# Patient Record
Sex: FEMALE | Race: OTHER | Employment: FULL TIME | URBAN - METROPOLITAN AREA
[De-identification: names, ages, dates, MRNs, and addresses within clinical notes are randomized per-mention and may not be internally consistent; named-entity substitution may affect disease eponyms.]

---

## 2022-03-30 ENCOUNTER — OFFICE VISIT (OUTPATIENT)
Dept: PRIMARY CARE CLINIC | Age: 22
End: 2022-03-30
Payer: COMMERCIAL

## 2022-03-30 VITALS
HEIGHT: 61 IN | BODY MASS INDEX: 27.9 KG/M2 | TEMPERATURE: 97.8 F | OXYGEN SATURATION: 99 % | SYSTOLIC BLOOD PRESSURE: 120 MMHG | WEIGHT: 147.8 LBS | DIASTOLIC BLOOD PRESSURE: 70 MMHG | HEART RATE: 69 BPM

## 2022-03-30 DIAGNOSIS — N60.11 FIBROCYSTIC BREAST CHANGES, BILATERAL: ICD-10-CM

## 2022-03-30 DIAGNOSIS — N60.12 FIBROCYSTIC BREAST CHANGES, BILATERAL: ICD-10-CM

## 2022-03-30 DIAGNOSIS — G43.109 MIGRAINE WITH AURA AND WITHOUT STATUS MIGRAINOSUS, NOT INTRACTABLE: ICD-10-CM

## 2022-03-30 DIAGNOSIS — M54.50 CHRONIC BILATERAL LOW BACK PAIN WITHOUT SCIATICA: Primary | ICD-10-CM

## 2022-03-30 DIAGNOSIS — G89.29 CHRONIC BILATERAL LOW BACK PAIN WITHOUT SCIATICA: Primary | ICD-10-CM

## 2022-03-30 PROCEDURE — 99203 OFFICE O/P NEW LOW 30 MIN: CPT | Performed by: FAMILY MEDICINE

## 2022-03-30 RX ORDER — IBUPROFEN 600 MG/1
600 TABLET ORAL 3 TIMES DAILY PRN
Qty: 90 TABLET | Refills: 1 | Status: SHIPPED | OUTPATIENT
Start: 2022-03-30 | End: 2022-06-01

## 2022-03-30 ASSESSMENT — PATIENT HEALTH QUESTIONNAIRE - PHQ9
3. TROUBLE FALLING OR STAYING ASLEEP: 0
5. POOR APPETITE OR OVEREATING: 0
1. LITTLE INTEREST OR PLEASURE IN DOING THINGS: 0
7. TROUBLE CONCENTRATING ON THINGS, SUCH AS READING THE NEWSPAPER OR WATCHING TELEVISION: 0
9. THOUGHTS THAT YOU WOULD BE BETTER OFF DEAD, OR OF HURTING YOURSELF: 0
2. FEELING DOWN, DEPRESSED OR HOPELESS: 0
SUM OF ALL RESPONSES TO PHQ9 QUESTIONS 1 & 2: 0
SUM OF ALL RESPONSES TO PHQ QUESTIONS 1-9: 3
10. IF YOU CHECKED OFF ANY PROBLEMS, HOW DIFFICULT HAVE THESE PROBLEMS MADE IT FOR YOU TO DO YOUR WORK, TAKE CARE OF THINGS AT HOME, OR GET ALONG WITH OTHER PEOPLE: 0
SUM OF ALL RESPONSES TO PHQ QUESTIONS 1-9: 3
6. FEELING BAD ABOUT YOURSELF - OR THAT YOU ARE A FAILURE OR HAVE LET YOURSELF OR YOUR FAMILY DOWN: 0
4. FEELING TIRED OR HAVING LITTLE ENERGY: 3
SUM OF ALL RESPONSES TO PHQ QUESTIONS 1-9: 3
SUM OF ALL RESPONSES TO PHQ QUESTIONS 1-9: 3
8. MOVING OR SPEAKING SO SLOWLY THAT OTHER PEOPLE COULD HAVE NOTICED. OR THE OPPOSITE, BEING SO FIGETY OR RESTLESS THAT YOU HAVE BEEN MOVING AROUND A LOT MORE THAN USUAL: 0

## 2022-03-30 NOTE — PATIENT INSTRUCTIONS
Patient Education        Dolor en la parte baja de la espalda (lumbalgia): Ejercicios  Low Back Pain: Exercises  Instrucciones de cuidado  Aquí se presentan algunos ejemplos de ejercicios típicos de rehabilitación para tratar ware afección. Empiece cada ejercicio lentamente. Reduzca la intensidaddel ejercicio si Dennie Brod a tener dolor. Ware médico o fisioterapeuta le dirán cuándo puede comenzar con estos ejerciciosy cuáles funcionarán mejor para usted. Cómo hacer los ejercicios  [place]art[place]as    1. Acuéstese boca abajo, apoyando ware cuerpo con los antebrazos. 2. Koffi presión con los codos en el piso para elevar la espalda. Al hacer esto, relaje los músculos del estómago y permita que ware espalda se arquee sin usar los músculos de la espalda. Al hacer presión, evite que las caderas o la pelvis se separen del piso. 3. Mantenga la posición de 15 a 30 segundos y luego relájese. 4. Repita de 2 a 4 veces. Ejercicio de alternar brazo y pierna (esther de caza)    Koffi abelardo ejercicio lentamente. Trate de mantener el cuerpo [place], y no deje que dg cadera caiga más abajo que la [place]. 1. Comience con las elsie y las rodillas en el piso. 2. Contraiga los músculos del abdomen. 3. Eleve dg pierna del suelo y manténgala recta detrás de usted. Tenga cuidado de no dejar caer la cadera hacia abajo, porque eso hará que se le tuerza el tronco.  4. Mantenga la posición cali unos 6 segundos y luego baje la pierna y [place] a la otra pierna. 5. Repita de 8 a 12 veces con cada pierna. 6. Con el tiempo, vaya aumentando [employer] de 10 a 30 segundos cada vez.  7. Si se siente estable y seguro con la pierna elevada, intente levantar el brazo opuesto directamente enfrente de usted al [employer]. Ejercicio de rodillas al pecho    1. Acuéstese boca arriba con las rodillas flexionadas y los pies apoyados sobre el piso.   2. Lleve dg [place], manteniendo el otro pie apoyado en el suelo (o dejando la las caderas y las rodillas estén en línea recta. 3. Mantenga la posición cali unos 6 segundos mientras sigue respirando normalmente, y luego baje lentamente las caderas hacia el piso y descanse por hasta 10 segundos. [address] 8 a 12 repeticiones. Estiramiento de isquiotibiales en el stefano de dg lolis    1. Acuéstese boca arriba en el stefano de [place], con dg pierna a través de la [place]. 2. Deslice la pierna hacia arriba por la pared, para enderezar la rodilla. Debe sentir un leve estiramiento en la parte posterior de la pierna. 3. Sostenga el estiramiento por lo menos de 15 a 30 segundos. No arquee la espalda, estire los dedos de los pies ni flexione las rodillas. Mantenga un talón tocando el suelo y el otro tocando la pared. 4. Repita con la otra pierna. 5. Repita de 2 a 4 veces con cada pierna. Estiramiento de los músculos flexores de la cadera    1. Póngase de rodillas en el suelo con dg Breanna Byes y Giselle Ruby atrás. Coloque la rodilla de adelante encima del pie. Mantenga la otra rodilla en contacto con el suelo. 2. Empuje lentamente la cadera hacia adelante hasta que sienta un estiramiento en la parte superior del muslo de la pierna que está atrás. 3. Sostenga el estiramiento por lo menos de 15 a 30 segundos. Repita con la otra pierna. 4. Repita de 2 a 4 veces a cada lado. Sentadillas de pared    1. Párese con la espalda a entre 10 y 12 pulgadas (25 a 30 cm) de distancia de la pared. 2. Inclínese hacia la pared [employer] la espalda quede plana sobre meir. 3. Deslícese lentamente hacia abajo hasta que las rodillas estén ligeramente flexionadas, presionando la parte baja de la espalda contra la pared. 4. Mantenga la posición cali unos 6 segundos y después deslícese hacia arriba por la pared. 5. Repita de 8 a 12 veces. La atención de seguimiento es dg parte clave de ware tratamiento y seguridad.  Asegúrese de hacer y acudir a todas las citas, y llame a ware médico si está teniendo problemas. También es dg buena idea saber los [place] de susexámenes y mantener dg lista de los medicamentos que sriram. ¿Dónde puede encontrar más información en inglés? Kandace Smith a [email or web address] e ingrese a ware cuenta de MyChart. Rachael Ramirez F778 en el Patricia Clinton \"Search Health Information\" para más información (en inglés) sobre \"Dolor en la parte baja de la espalda (lumbalgia): Ejercicios. \"     Si no tiene dg cuenta, mary alice laura en el enlace \"Sign Up Now\". Revisado: 1 julio, 2021               Versión del contenido: 13.2  © 1681-2607 Healthwise, Incorporated. Las instrucciones de cuidado fueron adaptadas bajo licencia por [facility] ([facility]). Si usted tiene [place] afección médica o sobre estas instrucciones, siempre pregunte a ware profesional de dewayne. Healthwise, Incorporated niega toda garantía o responsabilidad por ware uso de esta información.

## 2022-03-30 NOTE — PROGRESS NOTES
PROGRESS NOTE  Date of Service:  3/30/2022    Chief Complaint   Patient presents with    New Patient     Establish Care, need a PCP.  for Norma Smart ID # [id number]    Lower Back Pain     Started abot 6 months ago        SUBJECTIVE:  Jaren Wong is a 22 y.o. female, Croatian-speaking, Bobbi Jackson as , here as a new patient to establish care with me. Patient has 3 concerns #1 back pain for about 6 months. She does lifting and pushing at work 8 hours a day 5 days a week. Pain is localized and no history of trauma. Sometimes is stiff in the morning but it takes 3 to 4 minutes to limber up. No other joints involved. #2 migraine headache occurs about 2-3 times a week. Requesting ibuprofen. #3 had a breast lump removed in the past and feels that is on both breast sometimes it is painful. She has a bra size of 36-C, sometimes it feels heavy that aggravates her back pain. Past Medical History:   Diagnosis Date    Intraductal papilloma of right breast 2016    Had excision in [facility].      Past Surgical History:   Procedure Laterality Date    BREAST BIOPSY Right 2016    Intraductal papilloma      Social History     Tobacco Use    Smoking status: Never Smoker    Smokeless tobacco: Never Used   Substance Use Topics    Alcohol use: Never      Family History   Problem Relation Age of Onset    Diabetes Maternal Grandmother     High Blood Pressure Maternal Grandmother     High Blood Pressure Paternal Grandfather     High Cholesterol Paternal Grandfather     Heart Disease Paternal Grandfather      No current outpatient medications on file prior to visit. No current facility-administered medications on file prior to visit.        No Known Allergies     Review of Systems    OBJECTIVE:  /70 (Site: Left Upper Arm, Position: Sitting, Cuff Size: Medium Adult)   Pulse 69   Temp 97.8 °F (36.6 °C)   Ht 5' 1.2\" (1.554 m)   Wt 147 lb 12.8 oz (67 kg)   LMP 03/15/2022 SpO2 99%   BMI 27.74 kg/m²    Physical Exam  General Appearance: Alert, cooperative, no distress, appears stated age   [de-identified]: Normocephalic, PERRL, conjunctiva/sclera clear, EOM intact, TM's clear, oropharynx and nasopharynx clear   Neck: Supple, symmetrical, trachea midline, no adenopathy; thyroid: no enlargement/tenderness/nodules; no carotid bruit or JVD   Back: Symmetric, no curvature, ROM normal, no CVA tenderness, (+) paralumbar tenderness, SLR negative  Lungs: Clear to auscultation bilaterally, respirations unlabored   Breast: No nipple discharge or retraction, both breasts are dense and lumpy on both upper medial aspects. Heart: Regular rate and rhythm, S1 and S2 normal, no murmur, rub or gallop   Abdomen: Soft, non-tender, bowel sounds active all four quadrants, no masses, no organomegaly   Extremities: Extremities normal, atraumatic, no cyanosis or edema   Pulses: 2+ and symmetric all extremities   Skin: Skin color, texture, turgor normal, no rashes or lesions   Lymph nodes: Cervical, supraclavicular, and axillary nodes normal   Neurologic: CNII-XII intact, normal strength, sensation and reflexes throughout     ASSESSMENT:  1. Chronic bilateral low back pain without sciatica    2. Migraine with aura and without status migrainosus, not intractable    3. Fibrocystic breast changes, bilateral         PLAN:   1. Chronic bilateral low back pain without sciatica  Apply ice as needed. Will try ibuprofen 600 mg 3 times a day as needed. Back exercises provided. - ibuprofen (ADVIL;MOTRIN) 600 MG tablet; Take 1 tablet by mouth 3 times daily as needed for Pain  Dispense: 90 tablet; Refill: 1    2. Migraine with aura and without status migrainosus, not intractable  Migraine information provided, do headache diary and find for triggers and try to avoid it. Apply ice in the back of her head at the beating for headache. Can take ibuprofen as needed. - ibuprofen (ADVIL;MOTRIN) 600 MG tablet;  Take 1 tablet by mouth 3 times daily as needed for Pain  Dispense: 90 tablet; Refill: 1    3. Fibrocystic breast changes, bilateral  Sent for diagnostic mammogram and bilateral ultrasound as needed. - US BREAST COMPLETE RIGHT; Future  - US BREAST COMPLETE LEFT; Future  - JOAQUIN SAGAR DIGITAL DIAGNOSTIC BILATERAL; Future      Return in about 4 weeks (around 4/27/2022) for adult well check, follow on Migraine and back pain. Electronically signed by Ruchi Ferrell MD on 3/30/22 at 4:36 PM.     This dictation was generated by voice recognition computer software. Although all attempts are made to edit the dictation for accuracy, there may be errors in the transcription that are not intended.

## 2022-04-12 ENCOUNTER — HOSPITAL ENCOUNTER (OUTPATIENT)
Dept: ULTRASOUND IMAGING | Age: 22
Discharge: HOME | End: 2022-04-12
Payer: COMMERCIAL

## 2022-04-12 ENCOUNTER — APPOINTMENT (OUTPATIENT)
Dept: MAMMOGRAPHY | Age: 22
End: 2022-04-12
Payer: COMMERCIAL

## 2022-04-12 DIAGNOSIS — N60.11 FIBROCYSTIC BREAST CHANGES, BILATERAL: ICD-10-CM

## 2022-04-12 DIAGNOSIS — N60.12 FIBROCYSTIC BREAST CHANGES, BILATERAL: ICD-10-CM

## 2022-04-12 PROCEDURE — 76642 ULTRASOUND BREAST LIMITED: CPT

## 2022-05-13 ENCOUNTER — OFFICE VISIT (OUTPATIENT)
Dept: PRIMARY CARE CLINIC | Age: 22
End: 2022-05-13
Payer: COMMERCIAL

## 2022-05-13 VITALS
TEMPERATURE: 96.7 F | BODY MASS INDEX: 28.55 KG/M2 | HEART RATE: 77 BPM | HEIGHT: 61 IN | WEIGHT: 151.2 LBS | DIASTOLIC BLOOD PRESSURE: 70 MMHG | SYSTOLIC BLOOD PRESSURE: 120 MMHG | OXYGEN SATURATION: 99 %

## 2022-05-13 DIAGNOSIS — Z00.00 ENCOUNTER FOR WELL ADULT EXAM WITHOUT ABNORMAL FINDINGS: Primary | ICD-10-CM

## 2022-05-13 PROCEDURE — 99395 PREV VISIT EST AGE 18-39: CPT | Performed by: FAMILY MEDICINE

## 2022-05-13 ASSESSMENT — PATIENT HEALTH QUESTIONNAIRE - PHQ9
8. MOVING OR SPEAKING SO SLOWLY THAT OTHER PEOPLE COULD HAVE NOTICED. OR THE OPPOSITE, BEING SO FIGETY OR RESTLESS THAT YOU HAVE BEEN MOVING AROUND A LOT MORE THAN USUAL: 0
2. FEELING DOWN, DEPRESSED OR HOPELESS: 0
SUM OF ALL RESPONSES TO PHQ QUESTIONS 1-9: 0
SUM OF ALL RESPONSES TO PHQ QUESTIONS 1-9: 0
10. IF YOU CHECKED OFF ANY PROBLEMS, HOW DIFFICULT HAVE THESE PROBLEMS MADE IT FOR YOU TO DO YOUR WORK, TAKE CARE OF THINGS AT HOME, OR GET ALONG WITH OTHER PEOPLE: 0
SUM OF ALL RESPONSES TO PHQ QUESTIONS 1-9: 0
6. FEELING BAD ABOUT YOURSELF - OR THAT YOU ARE A FAILURE OR HAVE LET YOURSELF OR YOUR FAMILY DOWN: 0
9. THOUGHTS THAT YOU WOULD BE BETTER OFF DEAD, OR OF HURTING YOURSELF: 0
SUM OF ALL RESPONSES TO PHQ9 QUESTIONS 1 & 2: 0
SUM OF ALL RESPONSES TO PHQ QUESTIONS 1-9: 0
3. TROUBLE FALLING OR STAYING ASLEEP: 0
1. LITTLE INTEREST OR PLEASURE IN DOING THINGS: 0
7. TROUBLE CONCENTRATING ON THINGS, SUCH AS READING THE NEWSPAPER OR WATCHING TELEVISION: 0
4. FEELING TIRED OR HAVING LITTLE ENERGY: 0
5. POOR APPETITE OR OVEREATING: 0

## 2022-05-13 NOTE — PROGRESS NOTES
Chief Complaint   Patient presents with   Stephanie Escobar Annual Exam      [facility] ID # [id number]. Subjective:   Teresa Morocho is a 22 y.o. female and is here for a comprehensive physical exam.  Macanese-speaking, video  in the room. Patient reports her back has improved and only had 3 episodes of headache and has no recurrence. Been eating healthy and getting regular exercise. She plans to go back to school but she needs to work on her [place] language. Patient denies any concerns today. Denies any chest pains, shortness of breath, abdominal pain, melena medication.  History:  LMP: Patient's last menstrual period was 2022. Last pap date: Patient believes she had Pap smear last year when she saw her gynecologist, Dr. Mellissa Castaneda  Abnormal pap? ?  : 0  Para: 0  Breast US: 2022: no evidence of malignancy    Past Medical History:   Diagnosis Date    Intraductal papilloma of right breast 2016    Had excision in [facility].     Past Surgical History:   Procedure Laterality Date    BREAST BIOPSY Right 2016    Intraductal papilloma     Family History   Problem Relation Age of Onset    Diabetes Maternal Grandmother     High Blood Pressure Maternal Grandmother     High Blood Pressure Paternal Grandfather     High Cholesterol Paternal Grandfather     Heart Disease Paternal Grandfather      Social History     Tobacco Use    Smoking status: Never Smoker    Smokeless tobacco: Never Used   Substance Use Topics    Alcohol use: Never    Drug use: Never     Current Outpatient Medications   Medication Sig Dispense Refill    ibuprofen (ADVIL;MOTRIN) 600 MG tablet Take 1 tablet by mouth 3 times daily as needed for Pain (Patient not taking: Reported on 2022) 90 tablet 1     No current facility-administered medications for this visit. No Known Allergies    Do you take any herbs or supplements that were not prescribed by a doctor?  no  Are you taking calcium supplements? no  Are you taking aspirin daily? no    Review of Systems  Do you have pain that bothers you in your daily life? no  Review of Systems     Objective:   /70 (Site: Right Upper Arm, Position: Sitting, Cuff Size: Medium Adult)   Pulse 77   Temp 96.7 °F (35.9 °C)   Ht 5' 1.2\" (1.554 m)   Wt 151 lb 3.2 oz (68.6 kg)   LMP 04/19/2022   SpO2 99%   BMI 28.38 kg/m²    Physical Exam   Vision screening: OD 20/20, OS 20/20, OU 20/20 without correction  General:   alert and appears stated age   Gait:   normal   Skin:   normal   Oral cavity:   lips, mucosa, and tongue normal; teeth and gums normal   Eyes:   sclerae white, pupils equal and reactive, red reflex normal bilaterally   Ears:   normal bilaterally   Neck:   no adenopathy, supple, symmetrical, trachea midline and thyroid not enlarged, symmetric, no tenderness/mass/nodules   Lungs:  clear to auscultation bilaterally   Heart:   regular rate and rhythm, S1, S2 normal, no murmur, click, rub or gallop   Abdomen:  soft, non-tender; bowel sounds normal; no masses,  no organomegaly   Extremities:  Good range of motion, no edema, good pulses   Neuro:  normal without focal findings, mental status, speech normal, alert and oriented x3, ROSAURA and reflexes normal and symmetric      Assessment:     Healthy female exam.  22year-old     Plan:   1. Encounter for well adult exam without abnormal findings  Patient Counseling:  --Nutrition: Stressed importance of moderation in sodium/caffeine intake, saturated fat and cholesterol, caloric balance, sufficient intake of fresh fruits, vegetables, fiber, calcium, iron, and 1 mg of folate supplement per day (for females capable of pregnancy). --Discussed the issue of estrogen replacement, calcium supplement, and the daily use of baby aspirin. --Exercise: Stressed the importance of regular exercise.    --Substance Abuse: Discussed cessation/primary prevention of tobacco, alcohol, or other drug use; driving or other dangerous activities under the influence; availability of treatment for abuse. --Sexuality: Discussed sexually transmitted diseases, partner selection, use of condoms, avoidance of unintended pregnancy  and contraceptive alternatives. --Injury prevention: Discussed safety belts, safety helmets, smoke detector, smoking near bedding or upholstery. --Dental health: Discussed importance of regular tooth brushing, flossing, and dental visits. --Immunizations reviewed. Patient will [facility]  --Discussed benefits of screening colonoscopy. NA  -- Discussed healthy habits and regular exercise. Drawing centers provided for the fasting blood test and will call with test results. - CBC with Auto Differential; Future  - Lipid Panel; Future  - TSH with Reflex; Future  - Comprehensive Metabolic Panel; Future  - Hepatitis C Antibody; Future      Return in about 8 months (around 1/13/2023) for adult well check. Electronically signed by Mali Mejia MD on 5/13/2022 at 1:37 PM     This dictation was generated by voice recognition computer software. Although all attempts are made to edit the dictation for accuracy, there may be errors in the transcription that are not intended.

## 2022-05-13 NOTE — PATIENT INSTRUCTIONS

## 2022-05-27 ENCOUNTER — PATIENT MESSAGE (OUTPATIENT)
Dept: PRIMARY CARE CLINIC | Age: 22
End: 2022-05-27

## 2022-05-31 ENCOUNTER — TELEPHONE (OUTPATIENT)
Dept: PRIMARY CARE CLINIC | Age: 22
End: 2022-05-31

## 2022-05-31 NOTE — TELEPHONE ENCOUNTER
From: Freya Parrish  To: Dr. Deborah Anderson: 5/27/2022 8:53 PM EDT  Subject: Hello Doctor, it's Johalys     He wrote to you to see if there is any chance that they can give me an excuse for being away from work for a week, since I am sick I have a throat infection and body pain and the bosses don't want me to go to work like this but if I don't have an excuse they don't pay me those days. I'm taking some antibiotics but they haven't given me anything, I can hardly eat and I'm almost out of voice. If you think you're starting to make the excuse that it's next week now and that I can go back to work on the 6th. And if it is possible that they send it to my email [email or web address]

## 2022-05-31 NOTE — TELEPHONE ENCOUNTER
Pt is calling for a work note stating she has been sick a few days and needs a note from a doctor I told the pt that I see the message on my chart and the MA had told you that you Would need a appt pt is scheduled for June 1st with Wilber Sinha

## 2022-06-01 ENCOUNTER — OFFICE VISIT (OUTPATIENT)
Dept: PRIMARY CARE CLINIC | Age: 22
End: 2022-06-01
Payer: COMMERCIAL

## 2022-06-01 ENCOUNTER — NURSE ONLY (OUTPATIENT)
Dept: PRIMARY CARE CLINIC | Age: 22
End: 2022-06-01
Payer: COMMERCIAL

## 2022-06-01 VITALS
DIASTOLIC BLOOD PRESSURE: 76 MMHG | SYSTOLIC BLOOD PRESSURE: 118 MMHG | OXYGEN SATURATION: 99 % | BODY MASS INDEX: 28.16 KG/M2 | HEART RATE: 87 BPM | TEMPERATURE: 97.2 F | WEIGHT: 150 LBS

## 2022-06-01 DIAGNOSIS — R09.81 SINUS CONGESTION: ICD-10-CM

## 2022-06-01 DIAGNOSIS — Z00.00 ENCOUNTER FOR WELL ADULT EXAM WITHOUT ABNORMAL FINDINGS: ICD-10-CM

## 2022-06-01 DIAGNOSIS — R09.81 SINUS CONGESTION: Primary | ICD-10-CM

## 2022-06-01 PROBLEM — J02.9 SORE THROAT: Status: RESOLVED | Noted: 2022-06-01 | Resolved: 2022-06-01

## 2022-06-01 PROBLEM — J02.9 SORE THROAT: Status: ACTIVE | Noted: 2022-06-01

## 2022-06-01 LAB
A/G RATIO: 1.8 (ref 1.1–2.2)
ALBUMIN SERPL-MCNC: 4.7 G/DL (ref 3.4–5)
ALP BLD-CCNC: 101 U/L (ref 40–129)
ALT SERPL-CCNC: 12 U/L (ref 10–40)
ANION GAP SERPL CALCULATED.3IONS-SCNC: 15 MMOL/L (ref 3–16)
AST SERPL-CCNC: 14 U/L (ref 15–37)
BASOPHILS ABSOLUTE: 0.1 K/UL (ref 0–0.2)
BASOPHILS RELATIVE PERCENT: 1 %
BILIRUB SERPL-MCNC: 0.3 MG/DL (ref 0–1)
BUN BLDV-MCNC: 10 MG/DL (ref 7–20)
CALCIUM SERPL-MCNC: 9.7 MG/DL (ref 8.3–10.6)
CHLORIDE BLD-SCNC: 98 MMOL/L (ref 99–110)
CHOLESTEROL, TOTAL: 174 MG/DL (ref 0–199)
CO2: 24 MMOL/L (ref 21–32)
CREAT SERPL-MCNC: 0.7 MG/DL (ref 0.6–1.1)
EOSINOPHILS ABSOLUTE: 0.2 K/UL (ref 0–0.6)
EOSINOPHILS RELATIVE PERCENT: 2 %
GFR AFRICAN AMERICAN: >60
GFR NON-AFRICAN AMERICAN: >60
GLUCOSE BLD-MCNC: 78 MG/DL (ref 70–99)
HCT VFR BLD CALC: 39.8 % (ref 36–48)
HDLC SERPL-MCNC: 45 MG/DL (ref 40–60)
HEMOGLOBIN: 13.2 G/DL (ref 12–16)
HEPATITIS C ANTIBODY INTERPRETATION: NORMAL
INFLUENZA A ANTIBODY: NEGATIVE
INFLUENZA B ANTIBODY: NEGATIVE
LDL CHOLESTEROL CALCULATED: 109 MG/DL
LYMPHOCYTES ABSOLUTE: 3.6 K/UL (ref 1–5.1)
LYMPHOCYTES RELATIVE PERCENT: 47.2 %
MCH RBC QN AUTO: 28.9 PG (ref 26–34)
MCHC RBC AUTO-ENTMCNC: 33.2 G/DL (ref 31–36)
MCV RBC AUTO: 86.9 FL (ref 80–100)
MONOCYTES ABSOLUTE: 0.6 K/UL (ref 0–1.3)
MONOCYTES RELATIVE PERCENT: 7.4 %
NEUTROPHILS ABSOLUTE: 3.3 K/UL (ref 1.7–7.7)
NEUTROPHILS RELATIVE PERCENT: 42.4 %
PDW BLD-RTO: 13.3 % (ref 12.4–15.4)
PLATELET # BLD: 473 K/UL (ref 135–450)
PMV BLD AUTO: 7.9 FL (ref 5–10.5)
POTASSIUM SERPL-SCNC: 4.4 MMOL/L (ref 3.5–5.1)
RBC # BLD: 4.58 M/UL (ref 4–5.2)
S PYO AG THROAT QL: NORMAL
SODIUM BLD-SCNC: 137 MMOL/L (ref 136–145)
TOTAL PROTEIN: 7.3 G/DL (ref 6.4–8.2)
TRIGL SERPL-MCNC: 102 MG/DL (ref 0–150)
TSH REFLEX: 1.31 UIU/ML (ref 0.27–4.2)
VLDLC SERPL CALC-MCNC: 20 MG/DL
WBC # BLD: 7.7 K/UL (ref 4–11)

## 2022-06-01 PROCEDURE — 99203 OFFICE O/P NEW LOW 30 MIN: CPT

## 2022-06-01 PROCEDURE — 87804 INFLUENZA ASSAY W/OPTIC: CPT | Performed by: INTERNAL MEDICINE

## 2022-06-01 PROCEDURE — 87880 STREP A ASSAY W/OPTIC: CPT | Performed by: INTERNAL MEDICINE

## 2022-06-01 RX ORDER — PSEUDOEPHEDRINE HYDROCHLORIDE 30 MG/1
30 TABLET ORAL EVERY 6 HOURS PRN
Qty: 60 TABLET | Refills: 0 | Status: SHIPPED | OUTPATIENT
Start: 2022-06-01 | End: 2022-07-01

## 2022-06-01 RX ORDER — ACETAMINOPHEN 500 MG
500 TABLET ORAL EVERY 6 HOURS PRN
Qty: 120 TABLET | Refills: 0 | Status: SHIPPED | OUTPATIENT
Start: 2022-06-01 | End: 2022-07-01

## 2022-06-01 ASSESSMENT — ENCOUNTER SYMPTOMS
NAUSEA: 0
DIARRHEA: 0
COUGH: 0
WHEEZING: 0
CHEST TIGHTNESS: 0
ABDOMINAL PAIN: 0
VOMITING: 0
BLOOD IN STOOL: 0
SHORTNESS OF BREATH: 0
SORE THROAT: 1

## 2022-06-01 NOTE — ASSESSMENT & PLAN NOTE
Suspect viral cause. Covid, flu and strep test today - abx if strep +. Nasal decongestant and tylenol for supportive care, increase fluids and rest. Work note provided for rest of week. F/u in 5 days if no improvement or worsens.

## 2022-06-01 NOTE — PROGRESS NOTES
Olvin Miranda (:  2000) is a 22 y.o. female,Established patient, here for evaluation of the following chief complaint(s):  Pharyngitis (c/o sore throat, body pains, nasal congestion, headaches. Needs letter to return to work. Missed work 22 and today.  Ever #[id number])      HPI   Patient of Dr. John Sinha presents for sick visit for sore throat, nasal congestion, HA, and body aches x5 days, since Friday. Patient has not had a covid test yet, denies sick contact. +pain with swallowing. ASSESSMENT/PLAN:  1. Sinus congestion  Assessment & Plan:  Suspect viral cause. Covid, flu and strep test today - abx if strep +. Nasal decongestant and tylenol for supportive care, increase fluids and rest. Work note provided for rest of week. F/u in 5 days if no improvement or worsens. Orders:  -     COVID-19; Future  -     Strep Screen Group A Throat; Future  -     Rapid influenza A/B antigens; Future  -     pseudoephedrine (DECONGESTANT) 30 MG tablet; Take 1 tablet by mouth every 6 hours as needed for Congestion, Disp-60 tablet, R-0Normal  -     acetaminophen (TYLENOL) 500 MG tablet; Take 1 tablet by mouth every 6 hours as needed for Pain, Disp-120 tablet, R-0Normal       /76   Pulse 87   Temp 97.2 °F (36.2 °C) (Infrared)   Wt 150 lb (68 kg)   SpO2 99%   BMI 28.16 kg/m²    VSS    SUBJECTIVE/OBJECTIVE:  Review of Systems   Constitutional: Negative for fatigue, fever and unexpected weight change. HENT: Positive for congestion and sore throat. Respiratory: Negative for cough, chest tightness, shortness of breath and wheezing. Cardiovascular: Negative for chest pain, palpitations and leg swelling. Gastrointestinal: Negative for abdominal pain, blood in stool, diarrhea, nausea and vomiting. Musculoskeletal: Positive for arthralgias and myalgias. Neurological: Positive for headaches. Negative for dizziness, weakness and light-headedness.        Physical Exam  Vitals and nursing note reviewed. Constitutional:       General: She is not in acute distress. Appearance: Normal appearance. She is ill-appearing. HENT:      Right Ear: Tympanic membrane and ear canal normal.      Left Ear: Tympanic membrane and ear canal normal.      Ears:      Comments: Slight erythema bilaterally to ear canal     Nose: Congestion present. Mouth/Throat:      Mouth: Mucous membranes are moist.      Pharynx: Oropharynx is clear. Posterior oropharyngeal erythema present. Eyes:      General:         Right eye: No discharge. Left eye: No discharge. Conjunctiva/sclera: Conjunctivae normal.   Cardiovascular:      Rate and Rhythm: Normal rate and regular rhythm. Heart sounds: Normal heart sounds. Pulmonary:      Effort: Pulmonary effort is normal.      Breath sounds: Normal breath sounds. Skin:     General: Skin is warm and dry. Neurological:      Mental Status: She is alert and oriented to person, place, and time. Mental status is at baseline. Current Outpatient Medications   Medication Sig Dispense Refill    pseudoephedrine (DECONGESTANT) 30 MG tablet Take 1 tablet by mouth every 6 hours as needed for Congestion 60 tablet 0    acetaminophen (TYLENOL) 500 MG tablet Take 1 tablet by mouth every 6 hours as needed for Pain 120 tablet 0     No current facility-administered medications for this visit. Return in about 5 days (around 6/6/2022), or if symptoms worsen or fail to improve.     Electronically signed by LINDY Borrero CNP on 6/1/2022 at 8:41 AM

## 2022-06-01 NOTE — Clinical Note
[address] [place] [address]  Phone: [phone]  Fax: [phone]    LINDY Eric CNP        June 1, 2022     Patient: Jhoana Saleem   YOB: 2000   Date of Visit: 6/1/2022       To Whom it May Concern:    Jhoana Saleem was seen in my clinic on 6/1/2022. She {Return to school/sport/work:09155}. If you have any questions or concerns, please don't hesitate to call.     Sincerely,         LINDY Eric CNP

## 2022-06-01 NOTE — LETTER
[address] [facility] [address]  Phone: [phone]  Fax: [phone]    LINDY Murphy CNP        June 1, 2022     Patient: Herber Cummings   YOB: 2000   Date of Visit: 6/1/2022       To Whom It May Concern:    Please excuse Alison from work 5/31-6/6. It is my medical opinion that Herber Cummings may return to work on 6/6/2022. If you have any questions or concerns, please don't hesitate to call.     Sincerely,        LINDY Murphy CNP

## 2022-06-01 NOTE — PATIENT INSTRUCTIONS
Patient Education        Aprenda sobre los síntomas de COVID-19 y la gripe  Learning About COVID-19 and Flu Symptoms  ¿Cómo se puede diferenciar COVID-19 de la gripe? COVID-19 y la gripe tienen síntomas similares. Pueden ser difíciles de diferenciar. La única manera de saber con certeza qué enfermedad tiene es hacerse dg prueba. Si tiene [employer] de detección de COVID-19, pregúntele al médico o visite cdc.gov para usar la herramienta dedetección viral de COVID-19. Dado que los síntomas son tan parecidos, tiene sentido actuar liv si tuviera COVID-19 hasta que [employer] de la prueba. Dulce significa quedarse en casa y limitar el contacto con otras personas en ware hogar. Deberá lavarse las elsie con frecuencia y desinfectar las superficies que toque. Y asegúrese de usar dg mascarilla cuando esté cerca de otras personas. Estos también sonbuenos consejos si jose que tiene gripe. COVID-19 y la gripe tienen estos síntomas en común: Aissatou Cutler o escalofríos   Tos   Falta de aire   Fatiga (cansancio)   Dolor de garganta   Congestión o goteo nasal   Neela musculares y corporales   Dolor de roro   Vómito y diarrea (más común en niños que en adultos)  COVID-19 tiene otro síntoma que [place] puede presentarse:  Hernandez Ratel pérdida del gusto o el olfato  Los síntomas de COVID-19 pueden [employer] 2 y 15 gabe después de [address]. Los síntomas de la gripe suelen presentarse entre 1 y 3 días después de lainfección. ¿Por qué debería vacunarse contra la gripe cali la pandemia de COVID-19? Es importante ponerse la vacuna anual contra la gripe. Tanto la gripe liv COVID-19 pueden estar activos al [employer]. Ambas infecciones pueden enfermarlo al mismo tiempo. Y tener ambas infecciones puede enfermarlo más quetener solo [facility]. La vacuna contra la gripe no lo protegerá contra COVID-19. [place] puede ayudar a prevenir la gripe o a reducir rosio síntomas.  Si menos personas se enferman gravemente debido a la gripe, esto ayudará a dejar libres recursos médicosnecesarios para los pacientes con COVID-19.  ¿Dónde puede encontrar más información en inglés? Vallorkritsopher June a [email or web address] org e ingrese a ware cuenta de MyChart. Abdiaziz Hicks C123 en el cuadro \"Search Health Information\" para más información (en inglés) sobre \"Aprenda sobre los síntomas de COVID-19 y la gripe. \"     Si no tiene dg cuenta, mary alice laura en el enlace \"Sign Up Now\". Revisado: [address], 2021               Versión del contenido: 13.2  © 6523-9224 Healthwise, Incorporated. Las instrucciones de cuidado fueron adaptadas bajo licencia por [facility] ([facility]). Si usted tiene [place] afección médica o sobre estas instrucciones, siempre pregunte a ware profesional de dewayne. Healthwise, Incorporated niega toda garantía o responsabilidad por ware uso de esta información.

## 2022-06-02 LAB — SARS-COV-2: NOT DETECTED

## 2023-01-13 ENCOUNTER — OFFICE VISIT (OUTPATIENT)
Dept: PRIMARY CARE CLINIC | Age: 23
End: 2023-01-13
Payer: COMMERCIAL

## 2023-01-13 VITALS
HEIGHT: 62 IN | HEART RATE: 80 BPM | SYSTOLIC BLOOD PRESSURE: 102 MMHG | WEIGHT: 144 LBS | BODY MASS INDEX: 26.5 KG/M2 | OXYGEN SATURATION: 98 % | DIASTOLIC BLOOD PRESSURE: 70 MMHG

## 2023-01-13 DIAGNOSIS — Z00.00 ROUTINE GENERAL MEDICAL EXAMINATION AT A HEALTH CARE FACILITY: Primary | ICD-10-CM

## 2023-01-13 DIAGNOSIS — Z23 NEED FOR INFLUENZA VACCINATION: ICD-10-CM

## 2023-01-13 DIAGNOSIS — Z23 NEED FOR TDAP VACCINATION: ICD-10-CM

## 2023-01-13 DIAGNOSIS — Z23 NEED FOR HPV VACCINATION: ICD-10-CM

## 2023-01-13 DIAGNOSIS — G43.109 MIGRAINE WITH AURA AND WITHOUT STATUS MIGRAINOSUS, NOT INTRACTABLE: ICD-10-CM

## 2023-01-13 PROCEDURE — 90651 9VHPV VACCINE 2/3 DOSE IM: CPT | Performed by: FAMILY MEDICINE

## 2023-01-13 PROCEDURE — 90472 IMMUNIZATION ADMIN EACH ADD: CPT | Performed by: FAMILY MEDICINE

## 2023-01-13 PROCEDURE — 90715 TDAP VACCINE 7 YRS/> IM: CPT | Performed by: FAMILY MEDICINE

## 2023-01-13 PROCEDURE — 99395 PREV VISIT EST AGE 18-39: CPT | Performed by: FAMILY MEDICINE

## 2023-01-13 PROCEDURE — 90471 IMMUNIZATION ADMIN: CPT | Performed by: FAMILY MEDICINE

## 2023-01-13 PROCEDURE — 90674 CCIIV4 VAC NO PRSV 0.5 ML IM: CPT | Performed by: FAMILY MEDICINE

## 2023-01-13 RX ORDER — IBUPROFEN 600 MG/1
600 TABLET ORAL 3 TIMES DAILY PRN
Qty: 90 TABLET | Refills: 1 | Status: SHIPPED | OUTPATIENT
Start: 2023-01-13

## 2023-01-13 ASSESSMENT — PATIENT HEALTH QUESTIONNAIRE - PHQ9
2. FEELING DOWN, DEPRESSED OR HOPELESS: 0
SUM OF ALL RESPONSES TO PHQ QUESTIONS 1-9: 0
SUM OF ALL RESPONSES TO PHQ9 QUESTIONS 1 & 2: 0
SUM OF ALL RESPONSES TO PHQ QUESTIONS 1-9: 0
1. LITTLE INTEREST OR PLEASURE IN DOING THINGS: 0

## 2023-01-13 NOTE — PATIENT INSTRUCTIONS
Well Visit, Ages 25 to 72: Care Instructions  Well visits can help you stay healthy. Your doctor has checked your overall health and may have suggested ways to take good care of yourself. Your doctor also may have recommended tests. You can help prevent illness with healthy eating, good sleep, vaccinations, regular exercise, and other steps. Get the tests that you and your doctor decide on. Depending on your age and risks, examples might include screening for diabetes; hepatitis C; HIV; and cervical, breast, lung, and colon cancer. Screening helps find diseases before any symptoms appear. Eat healthy foods. Choose fruits, vegetables, whole grains, lean protein, and low-fat dairy foods. Limit saturated fat and reduce salt. Limit alcohol. Men should have no more than 2 drinks a day. Women should have no more than 1. For some people, no alcohol is the best choice. Exercise. Get at least 30 minutes of exercise on most days of the week. Walking can be a good choice. Reach and stay at your healthy weight. This will lower your risk for many health problems. Take care of your mental health. Try to stay connected with friends, family, and community, and find ways to manage stress. If you're feeling depressed or hopeless, talk to someone. A counselor can help. If you don't have a counselor, talk to your doctor. Talk to your doctor if you think you may have a problem with alcohol or drug use. This includes prescription medicines and illegal drugs. Avoid tobacco and nicotine: Don't smoke, vape, or chew. If you need help quitting, talk to your doctor. Practice safer sex. Getting tested, using condoms or dental dams, and limiting sex partners can help prevent STIs. Use birth control if it's important to you to prevent pregnancy. Talk with your doctor about your choices and what might be best for you. Prevent problems where you can.  Protect your skin from too much sun, wash your hands, brush your teeth twice a day, and wear a seat belt in the car. Where can you learn more? Go to http://www.guadarrama.com/ and enter P072 to learn more about \"Well Visit, Ages 25 to 72: Care Instructions. \"  Current as of: March 9, 2022               Content Version: 13.5  © 9330-2349 [employer], [employer]orporated. Care instructions adapted under license by [facility] ([facility]). If you have questions about a medical condition or this instruction, always ask your healthcare professional. [address] any warranty or liability for your use of this information.

## 2023-01-13 NOTE — PROGRESS NOTES
Chief Complaint   Patient presents with    Annual Exam     [facility] #[id number]  c/o bump behind right ear     Subjective:   Alison Lockhart is a 22 y.o. female, and is here for a comprehensive physical exam.  Australian-speaking, video  in the room.  Denies any chest pains, shortness of breath, abdominal pain, melena medication.  Complains of lump behind right ear and sometimes it has pain.  No ear drainage.  Denies any history of trauma.  She works in a factory, does packing.    Patient's been able to manage her migraine and identified her trigger which is coffee.  She just needed a refill of ibuprofen just to take it as needed.     History:  LMP: Patient's last menstrual period was 2022 (exact date).  Last pap date: Patient believes she had Pap smear last year when she saw her gynecologist, Dr. Barfield  Abnormal pap?  ?  : 0  Para: 0  Breast US: 2022: no evidence of malignancy    Past Medical History:   Diagnosis Date    Intraductal papilloma of right breast 2016    Had excision in [place].     Past Surgical History:   Procedure Laterality Date    BREAST BIOPSY Right 2016    Intraductal papilloma     Family History   Problem Relation Age of Onset    Diabetes Maternal Grandmother     High Blood Pressure Maternal Grandmother     High Blood Pressure Paternal Grandfather     High Cholesterol Paternal Grandfather     Heart Disease Paternal Grandfather      Social History     Tobacco Use    Smoking status: Never    Smokeless tobacco: Never   Vaping Use    Vaping Use: Never used   Substance Use Topics    Alcohol use: Never    Drug use: Never     Current Outpatient Medications   Medication Sig Dispense Refill    ibuprofen (ADVIL;MOTRIN) 600 MG tablet Take 1 tablet by mouth 3 times daily as needed for Pain 90 tablet 1     No current facility-administered medications for this visit.      No Known Allergies    Do you take any herbs or supplements that were not prescribed by a doctor?  no  Are you taking calcium supplements? no  Are you taking aspirin daily? no    Review of Systems  Do you have pain that bothers you in your daily life? no  Review of Systems     Objective:   /70   Pulse 80   Ht 5' 1.5\" (1.562 m)   Wt 144 lb (65.3 kg)   LMP 12/21/2022 (Exact Date)   SpO2 98%   BMI 26.77 kg/m²    Physical Exam   Vision screening: OD 20/20, OS 20/20, OU 20/20 without correction  General:   alert and appears stated age   Gait:   normal   Skin:   normal   Oral cavity:   lips, mucosa, and tongue normal; teeth and gums normal   Eyes:   sclerae white, pupils equal and reactive, red reflex normal bilaterally   Ears:   normal bilaterally, bony prominence noted behind both ears and no tenderness. Neck:   no adenopathy, supple, symmetrical, trachea midline and thyroid not enlarged, symmetric, no tenderness/mass/nodules   Lungs:  clear to auscultation bilaterally   Heart:   regular rate and rhythm, S1, S2 normal, no murmur, click, rub or gallop   Abdomen:  soft, non-tender; bowel sounds normal; no masses,  no organomegaly   Extremities:  Good range of motion, no edema, good pulses   Neuro:  normal without focal findings, mental status, speech normal, alert and oriented x3, ROSAURA and reflexes normal and symmetric      Assessment:     Healthy female exam.  25year-old     Plan:   1. Encounter for well adult exam without abnormal findings  Patient Counseling:  --Nutrition: Stressed importance of moderation in sodium/caffeine intake, saturated fat and cholesterol, caloric balance, sufficient intake of fresh fruits, vegetables, fiber, calcium, iron, and 1 mg of folate supplement per day (for females capable of pregnancy). --Exercise: Stressed the importance of regular exercise. --Substance Abuse: Discussed cessation/primary prevention of tobacco, alcohol, or other drug use; driving or other dangerous activities under the influence; availability of treatment for abuse.     --Sexuality: Discussed sexually transmitted diseases, partner selection, use of condoms, avoidance of unintended pregnancy  and contraceptive alternatives.   --Injury prevention: Discussed safety belts, safety helmets, smoke detector, smoking near bedding or upholstery.   --Dental health: Discussed importance of regular tooth brushing, flossing, and dental visits.  --Immunizations reviewed.  Patient will try to get her immunization records from [place].  --Discussed benefits of screening colonoscopy. NA  -- Discussed healthy habits and regular exercise.       2. Need for influenza vaccination  VIS given.  No history of immunization reactions.  - Influenza, FLUCELVAX, (age 6 mo+), IM, Preservative Free, 0.5 mL    3. Need for HPV vaccination  VIS given.  No history of immunization reaction.  Follow-up in 2 months for HPV #2.  - HPV, GARDASIL 9, (AGE 9-45 YRS), IM    4. Need for Tdap vaccination  VIS given.  No history of immunization reaction.  - Tdap, BOOSTRIX, (age 10 yrs+), IM    5. Migraine with aura and without status migrainosus, not intractable  Patient's been able to identify and manage her headache.  Ibuprofen refilled as requested.  - ibuprofen (ADVIL;MOTRIN) 600 MG tablet; Take 1 tablet by mouth 3 times daily as needed for Pain  Dispense: 90 tablet; Refill: 1    6.  Ear pain/lump, patient reassured no tumor felt, it is more of a bony prominence.  Since she works in a factory, does lifting it could be muscle strain in the neck area.  Encouraged to drink more water to avoid muscle spasms or cramps.  Apply ice as needed and may also take ibuprofen as needed.    Return in about 1 year (around 1/13/2024) for adult well check, f/u in 2 months for HPV#2 MA only.     Electronically signed by Eleia J Reyes, MD on 1/13/2023 at 3:39 PM     This dictation was generated by voice recognition computer software.  Although all attempts are made to edit the dictation for accuracy, there may be errors in the transcription that are not intended.

## 2023-09-20 ENCOUNTER — NURSE ONLY (OUTPATIENT)
Dept: PRIMARY CARE CLINIC | Age: 23
End: 2023-09-20
Payer: COMMERCIAL

## 2023-09-20 DIAGNOSIS — Z32.01 POSITIVE PREGNANCY TEST: Primary | ICD-10-CM

## 2023-09-20 LAB
CONTROL: POSITIVE
PREGNANCY TEST URINE, POC: POSITIVE

## 2023-09-20 PROCEDURE — 81025 URINE PREGNANCY TEST: CPT | Performed by: FAMILY MEDICINE

## 2023-09-20 NOTE — PROGRESS NOTES
Patient here for pregnancy test. LMP 8/11     Results: Positive.      She is also requesting a letter for work to confirm positive test.

## 2023-10-06 ENCOUNTER — HOSPITAL ENCOUNTER (EMERGENCY)
Age: 23
Discharge: HOME | End: 2023-10-07
Attending: STUDENT IN AN ORGANIZED HEALTH CARE EDUCATION/TRAINING PROGRAM
Payer: COMMERCIAL

## 2023-10-06 ENCOUNTER — APPOINTMENT (OUTPATIENT)
Dept: ULTRASOUND IMAGING | Age: 23
End: 2023-10-06
Payer: COMMERCIAL

## 2023-10-06 VITALS
SYSTOLIC BLOOD PRESSURE: 131 MMHG | HEIGHT: 61 IN | OXYGEN SATURATION: 99 % | HEART RATE: 93 BPM | BODY MASS INDEX: 28.13 KG/M2 | TEMPERATURE: 98.6 F | WEIGHT: 149 LBS | RESPIRATION RATE: 16 BRPM | DIASTOLIC BLOOD PRESSURE: 83 MMHG

## 2023-10-06 DIAGNOSIS — R10.9 ABDOMINAL PAIN, UNSPECIFIED ABDOMINAL LOCATION: Primary | ICD-10-CM

## 2023-10-06 LAB
ABO + RH BLD: NORMAL
ALBUMIN SERPL-MCNC: 4.6 G/DL (ref 3.4–5)
ALBUMIN/GLOB SERPL: 1.4 {RATIO} (ref 1.1–2.2)
ALP SERPL-CCNC: 96 U/L (ref 40–129)
ALT SERPL-CCNC: 11 U/L (ref 10–40)
ANION GAP SERPL CALCULATED.3IONS-SCNC: 11 MMOL/L (ref 3–16)
AST SERPL-CCNC: 14 U/L (ref 15–37)
B-HCG SERPL EIA 3RD IS-ACNC: NORMAL MIU/ML
BACTERIA GENITAL QL WET PREP: NORMAL
BACTERIA URNS QL MICRO: NORMAL /HPF
BASOPHILS # BLD: 0.1 K/UL (ref 0–0.2)
BASOPHILS NFR BLD: 0.4 %
BILIRUB SERPL-MCNC: <0.2 MG/DL (ref 0–1)
BILIRUB UR QL STRIP.AUTO: NEGATIVE
BUN SERPL-MCNC: 10 MG/DL (ref 7–20)
CALCIUM SERPL-MCNC: 9.8 MG/DL (ref 8.3–10.6)
CHLORIDE SERPL-SCNC: 101 MMOL/L (ref 99–110)
CLARITY UR: ABNORMAL
CLUE CELLS SPEC QL WET PREP: NORMAL
CO2 SERPL-SCNC: 26 MMOL/L (ref 21–32)
COLOR UR: YELLOW
CREAT SERPL-MCNC: 0.5 MG/DL (ref 0.6–1.1)
DEPRECATED RDW RBC AUTO: 12.8 % (ref 12.4–15.4)
EOSINOPHIL # BLD: 0.1 K/UL (ref 0–0.6)
EOSINOPHIL NFR BLD: 0.6 %
EPI CELLS #/AREA URNS AUTO: 0 /HPF (ref 0–5)
EPI CELLS SPEC QL WET PREP: NORMAL
GFR SERPLBLD CREATININE-BSD FMLA CKD-EPI: >60 ML/MIN/{1.73_M2}
GLUCOSE SERPL-MCNC: 113 MG/DL (ref 70–99)
GLUCOSE UR STRIP.AUTO-MCNC: NEGATIVE MG/DL
HCT VFR BLD AUTO: 39.8 % (ref 36–48)
HGB BLD-MCNC: 13.4 G/DL (ref 12–16)
HGB UR QL STRIP.AUTO: NEGATIVE
HYALINE CASTS #/AREA URNS AUTO: 0 /LPF (ref 0–8)
KETONES UR STRIP.AUTO-MCNC: NEGATIVE MG/DL
LEUKOCYTE ESTERASE UR QL STRIP.AUTO: NEGATIVE
LYMPHOCYTES # BLD: 4.5 K/UL (ref 1–5.1)
LYMPHOCYTES NFR BLD: 32.1 %
MCH RBC QN AUTO: 29.5 PG (ref 26–34)
MCHC RBC AUTO-ENTMCNC: 33.6 G/DL (ref 31–36)
MCV RBC AUTO: 87.9 FL (ref 80–100)
MONOCYTES # BLD: 0.8 K/UL (ref 0–1.3)
MONOCYTES NFR BLD: 5.9 %
NEUTROPHILS # BLD: 8.6 K/UL (ref 1.7–7.7)
NEUTROPHILS NFR BLD: 61 %
NITRITE UR QL STRIP.AUTO: NEGATIVE
PH UR STRIP.AUTO: 7.5 [PH] (ref 5–8)
PLATELET # BLD AUTO: 396 K/UL (ref 135–450)
PMV BLD AUTO: 8.1 FL (ref 5–10.5)
POTASSIUM SERPL-SCNC: 3.8 MMOL/L (ref 3.5–5.1)
PROT SERPL-MCNC: 7.9 G/DL (ref 6.4–8.2)
PROT UR STRIP.AUTO-MCNC: NEGATIVE MG/DL
RBC # BLD AUTO: 4.53 M/UL (ref 4–5.2)
RBC CLUMPS #/AREA URNS AUTO: 0 /HPF (ref 0–4)
RBC SPEC QL WET PREP: NORMAL
SODIUM SERPL-SCNC: 138 MMOL/L (ref 136–145)
SP GR UR STRIP.AUTO: 1.01 (ref 1–1.03)
SPECIMEN SOURCE FLD: NORMAL
T VAGINALIS GENITAL QL WET PREP: NORMAL
UA COMPLETE W REFLEX CULTURE PNL UR: ABNORMAL
UA DIPSTICK W REFLEX MICRO PNL UR: YES
URN SPEC COLLECT METH UR: ABNORMAL
UROBILINOGEN UR STRIP-ACNC: 0.2 E.U./DL
WBC # BLD AUTO: 14.1 K/UL (ref 4–11)
WBC #/AREA URNS AUTO: 1 /HPF (ref 0–5)
WBC SPEC QL WET PREP: NORMAL
YEAST GENITAL QL WET PREP: NORMAL

## 2023-10-06 PROCEDURE — 81001 URINALYSIS AUTO W/SCOPE: CPT

## 2023-10-06 PROCEDURE — 76817 TRANSVAGINAL US OBSTETRIC: CPT

## 2023-10-06 PROCEDURE — 87210 SMEAR WET MOUNT SALINE/INK: CPT

## 2023-10-06 PROCEDURE — 87591 N.GONORRHOEAE DNA AMP PROB: CPT

## 2023-10-06 PROCEDURE — 6370000000 HC RX 637 (ALT 250 FOR IP): Performed by: PHYSICIAN ASSISTANT

## 2023-10-06 PROCEDURE — 84702 CHORIONIC GONADOTROPIN TEST: CPT

## 2023-10-06 PROCEDURE — 85025 COMPLETE CBC W/AUTO DIFF WBC: CPT

## 2023-10-06 PROCEDURE — 87491 CHLMYD TRACH DNA AMP PROBE: CPT

## 2023-10-06 PROCEDURE — 80053 COMPREHEN METABOLIC PANEL: CPT

## 2023-10-06 PROCEDURE — 99284 EMERGENCY DEPT VISIT MOD MDM: CPT

## 2023-10-06 PROCEDURE — 86900 BLOOD TYPING SEROLOGIC ABO: CPT

## 2023-10-06 PROCEDURE — 86901 BLOOD TYPING SEROLOGIC RH(D): CPT

## 2023-10-06 RX ORDER — ACETAMINOPHEN 500 MG
1000 TABLET ORAL ONCE
Status: COMPLETED | OUTPATIENT
Start: 2023-10-06 | End: 2023-10-06

## 2023-10-06 RX ADMIN — ACETAMINOPHEN 1000 MG: 500 TABLET ORAL at 18:58

## 2023-10-06 ASSESSMENT — ENCOUNTER SYMPTOMS
VOMITING: 0
RESPIRATORY NEGATIVE: 1
ABDOMINAL PAIN: 1
CHEST TIGHTNESS: 0
BACK PAIN: 0
COUGH: 0
NAUSEA: 0
COLOR CHANGE: 0
CONSTIPATION: 0
ABDOMINAL DISTENTION: 0
SHORTNESS OF BREATH: 0
DIARRHEA: 0

## 2023-10-06 ASSESSMENT — PAIN DESCRIPTION - DESCRIPTORS
DESCRIPTORS: ACHING
DESCRIPTORS: ACHING

## 2023-10-06 ASSESSMENT — PAIN - FUNCTIONAL ASSESSMENT: PAIN_FUNCTIONAL_ASSESSMENT: 0-10

## 2023-10-06 ASSESSMENT — PAIN SCALES - GENERAL
PAINLEVEL_OUTOF10: 8
PAINLEVEL_OUTOF10: 8

## 2023-10-06 ASSESSMENT — PAIN DESCRIPTION - ORIENTATION: ORIENTATION: LOWER

## 2023-10-06 ASSESSMENT — PAIN DESCRIPTION - LOCATION
LOCATION: ABDOMEN
LOCATION: ABDOMEN

## 2023-10-06 NOTE — ED PROVIDER NOTES
[facility] ENCOUNTER        Pt Name: Nneka Joaquin  MRN: [id number]  [address] 2000  Date of evaluation: 10/6/2023  Provider: TENISHA Townsend  PCP: Regina Sandhu MD  Note Started: 6:46 PM EDT 10/6/23       I have seen and evaluated this patient with my supervising physician Anatoly Hutchison MD.      [address]       Chief Complaint   Patient presents with    Abdominal Pain     7 weeks pregnant and having lower abdominal pain and vaginal bleeding. Had bleeding on Sunday, then the bleeding started again today. HISTORY OF PRESENT ILLNESS: 1 or more Elements     History From: Patient  Limitations to history : Language Uzbek    Nneka Joaquin is a 25 y.o. female with no significant past medical history who presents ED with complaint of abdominal pain. Patient complaint of pain to her left lower abdomen. Patient reports pain began on Sunday. Has had some vaginal bleeding. She denies any passage of clots. Bleeding started on Sunday stopped and then started again today. Patient reports she is currently pregnant. She reports she is 9 weeks pregnant contrary to nursing triage note. She reports she is followed up for this pregnancy on outpatient basis. Is scheduled to see physician in [facility] 2 weeks from today. She reports she had an ultrasound at 7 weeks that she reports was unremarkable. She reports that they did see the \"baby\" and her ultrasound was \"normal\". She denies any nausea, vomiting, urinary symptoms or changes in bowel movements. Denies any fever or chills. Denies chest pain or shortness of breath. She is G1, P0 Ab0. She is unsure of her blood type. Rates pain as an 8/10. Denies taking any over-the-counter medication for symptom control. Became concerned and came to the ED for further evaluation and treatment.     Nursing Notes were all reviewed and agreed with or any disagreements

## 2023-10-07 NOTE — ED PROVIDER NOTES
In addition to the advanced practice provider, I personally saw Janine Hathaway and performed a substantive portion of the visit including all aspects of the medical decision making. Medical Decision Making    Pt with abdominal pain to LLQ and right lower quadrant  Is about 9 weeks pregnant, started bleeding Saturday which stopped then again today  Uncertain if she had an US of this pregnancy    On exam patient has tenderness to palpation right lower quadrant and left lower quadrant. No rebound or guarding. Chaperone is present for  exam.  Os is closed. There is no significant vaginal discharge noted or bleeding. She has no cervical motion tenderness. She has no palpable adnexal mass or adnexal tenderness. SEP-1  Is this patient to be included in the SEP-1 Core Measure due to severe sepsis or septic shock? No   Exclusion criteria - the patient is NOT to be included for SEP-1 Core Measure due to: Infection is not suspected    Screenings     [place] Coma Scale  Eye Opening: Spontaneous  Best Verbal Response: Oriented  Best Motor Response: Obeys commands  [place] Coma Scale Score: 15           US OB TRANSVAGINAL   Final Result   Single live intrauterine pregnancy with an estimated age of 8 weeks 0 days   and heart rate of 144 beats per minute. Some cystic changes and or small amount of subchorionic hemorrhage along the   inferior edge of the gestational sac. Left ovary is not visualized or evaluated.            Labs Reviewed   CBC WITH AUTO DIFFERENTIAL - Abnormal; Notable for the following components:       Result Value    WBC 14.1 (*)     Neutrophils Absolute 8.6 (*)     All other components within normal limits   COMPREHENSIVE METABOLIC PANEL W/ REFLEX TO MG FOR LOW K - Abnormal; Notable for the following components:    Glucose 113 (*)     Creatinine 0.5 (*)     AST 14 (*)     All other components within normal limits   URINALYSIS WITH REFLEX TO CULTURE - Abnormal; Notable for

## 2023-10-09 LAB
C TRACH DNA CVX QL NAA+PROBE: NEGATIVE
N GONORRHOEA DNA CERV MUCUS QL NAA+PROBE: NEGATIVE

## 2025-01-28 ENCOUNTER — OFFICE VISIT (OUTPATIENT)
Dept: PRIMARY CARE CLINIC | Age: 25
End: 2025-01-28

## 2025-01-28 VITALS
OXYGEN SATURATION: 100 % | HEART RATE: 92 BPM | RESPIRATION RATE: 16 BRPM | SYSTOLIC BLOOD PRESSURE: 100 MMHG | DIASTOLIC BLOOD PRESSURE: 70 MMHG | TEMPERATURE: 98.1 F | HEIGHT: 61 IN | BODY MASS INDEX: 27.19 KG/M2 | WEIGHT: 144 LBS

## 2025-01-28 DIAGNOSIS — J02.9 PHARYNGITIS, UNSPECIFIED ETIOLOGY: Primary | ICD-10-CM

## 2025-01-28 PROBLEM — R09.81 SINUS CONGESTION: Status: RESOLVED | Noted: 2022-06-01 | Resolved: 2025-01-28

## 2025-01-28 LAB
INFLUENZA A ANTIGEN, POC: NEGATIVE
INFLUENZA B ANTIGEN, POC: NEGATIVE
LOT EXPIRE DATE: NORMAL
LOT KIT NUMBER: NORMAL
S PYO AG THROAT QL: NORMAL
SARS-COV-2 RNA, POC: NEGATIVE
VALID INTERNAL CONTROL: NORMAL
VENDOR AND KIT NAME POC: NORMAL

## 2025-01-28 ASSESSMENT — ENCOUNTER SYMPTOMS
BLOOD IN STOOL: 0
SORE THROAT: 1
COUGH: 0
DIARRHEA: 0
CHEST TIGHTNESS: 0
NAUSEA: 0
WHEEZING: 0
ABDOMINAL PAIN: 0
COLOR CHANGE: 0
SHORTNESS OF BREATH: 0
FACIAL SWELLING: 1
VOMITING: 0

## 2025-01-28 NOTE — PROGRESS NOTES
Alison Lockhart (:  2000) is a 24 y.o. female,Established patient, here for evaluation of the following chief complaint(s):  Pharyngitis (Fever  )      Pharyngitis  Associated symptoms include a sore throat. Pertinent negatives include no abdominal pain, chest pain, chills, congestion, coughing, fatigue, fever, headaches, nausea, rash, vomiting or weakness.     Patient of Dr. Reyes presents for sick visit with complaint of sore throat and swollen glands in throat as well as episode of fever starting this past . Patient states she has been able to control fever with OTC medications, but sore throat is only getting worse. Patient denies sinus congestion, rhinorrhea, cough or shortness of breath. No nausea or vomiting.     ASSESSMENT/PLAN:  1. Pharyngitis, unspecified etiology  -     AMB POC SARS-COV-2 AND INFLUENZA A/B  -     POCT rapid strep A  -     amoxicillin-clavulanate (AUGMENTIN) 875-125 MG per tablet; Take 1 tablet by mouth 2 times daily for 10 days, Disp-20 tablet, R-0Normal  Strep, flu and covid swabs negative in office, although based on physical exam will treat with Augmentin BID for presumed strep pharyngitis. Continue OTC medications as needed, patient provided note to remain home remainder of week from work.     /70   Pulse 92   Temp 98.1 °F (36.7 °C) (Oral)   Resp 16   Ht 1.549 m (5' 1\")   Wt 65.3 kg (144 lb)   LMP 2025   SpO2 100%   BMI 27.21 kg/m²  VSS    SUBJECTIVE/OBJECTIVE:  Review of Systems   Constitutional:  Negative for chills, fatigue, fever and unexpected weight change.   HENT:  Positive for ear pain, facial swelling (Neck, swollen lymph nodes) and sore throat. Negative for congestion.    Respiratory:  Negative for cough, chest tightness, shortness of breath and wheezing.    Cardiovascular:  Negative for chest pain, palpitations and leg swelling.   Gastrointestinal:  Negative for abdominal pain, blood in stool, diarrhea, nausea and vomiting.